# Patient Record
Sex: FEMALE | Race: WHITE | NOT HISPANIC OR LATINO | Employment: OTHER | URBAN - METROPOLITAN AREA
[De-identification: names, ages, dates, MRNs, and addresses within clinical notes are randomized per-mention and may not be internally consistent; named-entity substitution may affect disease eponyms.]

---

## 2021-08-01 ENCOUNTER — HOSPITAL ENCOUNTER (EMERGENCY)
Facility: HOSPITAL | Age: 46
Discharge: HOME | End: 2021-08-01
Attending: EMERGENCY MEDICINE | Admitting: EMERGENCY MEDICINE

## 2021-08-01 VITALS
DIASTOLIC BLOOD PRESSURE: 97 MMHG | RESPIRATION RATE: 18 BRPM | HEART RATE: 91 BPM | SYSTOLIC BLOOD PRESSURE: 161 MMHG | OXYGEN SATURATION: 97 % | HEIGHT: 70 IN | TEMPERATURE: 98 F | WEIGHT: 251.99 LBS | BODY MASS INDEX: 36.07 KG/M2

## 2021-08-01 DIAGNOSIS — R52 ENCOUNTER FOR PAIN MANAGEMENT: ICD-10-CM

## 2021-08-01 DIAGNOSIS — Z76.0 MEDICATION REFILL: Primary | ICD-10-CM

## 2021-08-01 PROCEDURE — 94799 UNLISTED PULMONARY SVC/PX: CPT

## 2021-08-01 PROCEDURE — 99283 EMERGENCY DEPT VISIT LOW MDM: CPT

## 2021-08-01 PROCEDURE — 94640 AIRWAY INHALATION TREATMENT: CPT

## 2021-08-01 PROCEDURE — 81001 URINALYSIS AUTO W/SCOPE: CPT | Performed by: NURSE PRACTITIONER

## 2021-08-01 RX ORDER — HYDROCHLOROTHIAZIDE 25 MG/1
25 TABLET ORAL DAILY
COMMUNITY
End: 2021-09-15

## 2021-08-01 RX ORDER — ALBUTEROL SULFATE 1.25 MG/3ML
1 SOLUTION RESPIRATORY (INHALATION) EVERY 6 HOURS PRN
Qty: 60 EACH | Refills: 0 | Status: SHIPPED | OUTPATIENT
Start: 2021-08-01 | End: 2021-09-15 | Stop reason: SDUPTHER

## 2021-08-01 RX ORDER — METHOCARBAMOL 500 MG/1
500 TABLET, FILM COATED ORAL 2 TIMES DAILY
COMMUNITY
End: 2021-11-30 | Stop reason: SDUPTHER

## 2021-08-01 RX ORDER — ONDANSETRON 4 MG/1
4 TABLET, FILM COATED ORAL EVERY 8 HOURS PRN
COMMUNITY
End: 2021-09-15 | Stop reason: SDUPTHER

## 2021-08-01 RX ORDER — MONTELUKAST SODIUM 10 MG/1
10 TABLET ORAL NIGHTLY
COMMUNITY
End: 2021-09-15 | Stop reason: SDUPTHER

## 2021-08-01 RX ORDER — IBUPROFEN 800 MG/1
800 TABLET ORAL EVERY 6 HOURS PRN
Qty: 60 TABLET | Refills: 0 | Status: SHIPPED | OUTPATIENT
Start: 2021-08-01 | End: 2021-09-15

## 2021-08-01 RX ORDER — PANTOPRAZOLE SODIUM 40 MG/1
40 TABLET, DELAYED RELEASE ORAL DAILY
COMMUNITY
End: 2021-11-30 | Stop reason: SDUPTHER

## 2021-08-01 RX ORDER — CETIRIZINE HYDROCHLORIDE 10 MG/1
10 TABLET ORAL DAILY
COMMUNITY
End: 2021-11-30 | Stop reason: SDUPTHER

## 2021-08-01 RX ORDER — PRAZOSIN HYDROCHLORIDE 2 MG/1
2 CAPSULE ORAL NIGHTLY
COMMUNITY
End: 2022-06-01 | Stop reason: SDUPTHER

## 2021-08-01 RX ORDER — QUETIAPINE FUMARATE 50 MG/1
50 TABLET, FILM COATED ORAL NIGHTLY
COMMUNITY
End: 2021-09-15

## 2021-08-01 RX ORDER — ALPRAZOLAM 1 MG/1
1 TABLET ORAL 3 TIMES DAILY
COMMUNITY

## 2021-08-01 RX ORDER — BUSPIRONE HYDROCHLORIDE 15 MG/1
15 TABLET ORAL 2 TIMES DAILY
COMMUNITY
End: 2022-04-12

## 2021-08-01 RX ORDER — IPRATROPIUM BROMIDE AND ALBUTEROL SULFATE 2.5; .5 MG/3ML; MG/3ML
3 SOLUTION RESPIRATORY (INHALATION) ONCE
Status: COMPLETED | OUTPATIENT
Start: 2021-08-01 | End: 2021-08-01

## 2021-08-01 RX ORDER — CITALOPRAM 40 MG/1
40 TABLET ORAL DAILY
COMMUNITY
End: 2021-10-11

## 2021-08-01 RX ORDER — SOAP
BAR TOPICAL
COMMUNITY
End: 2021-09-15

## 2021-08-01 RX ORDER — OXYCODONE HYDROCHLORIDE 30 MG/1
30 TABLET, FILM COATED, EXTENDED RELEASE ORAL 3 TIMES DAILY
COMMUNITY
End: 2021-09-15

## 2021-08-01 RX ORDER — ALBUTEROL SULFATE 90 UG/1
2 AEROSOL, METERED RESPIRATORY (INHALATION) EVERY 4 HOURS PRN
Qty: 6.7 G | Refills: 0 | Status: SHIPPED | OUTPATIENT
Start: 2021-08-01 | End: 2021-09-15 | Stop reason: SDUPTHER

## 2021-08-01 RX ORDER — PREGABALIN 100 MG/1
150 CAPSULE ORAL 2 TIMES DAILY
COMMUNITY
End: 2021-09-15

## 2021-08-01 RX ORDER — PREGABALIN 150 MG/1
150 CAPSULE ORAL 2 TIMES DAILY
Qty: 60 CAPSULE | Refills: 0 | Status: SHIPPED | OUTPATIENT
Start: 2021-08-01 | End: 2021-09-15

## 2021-08-01 RX ADMIN — IPRATROPIUM BROMIDE AND ALBUTEROL SULFATE 3 ML: .5; 2.5 SOLUTION RESPIRATORY (INHALATION) at 22:28

## 2021-08-02 LAB
BACTERIA UR QL AUTO: ABNORMAL /HPF
BILIRUB UR QL STRIP: NEGATIVE
CLARITY UR: ABNORMAL
COLOR UR: YELLOW
GLUCOSE UR STRIP-MCNC: NEGATIVE MG/DL
HGB UR QL STRIP.AUTO: NEGATIVE
HYALINE CASTS UR QL AUTO: ABNORMAL /LPF
KETONES UR QL STRIP: ABNORMAL
LEUKOCYTE ESTERASE UR QL STRIP.AUTO: ABNORMAL
NITRITE UR QL STRIP: NEGATIVE
PH UR STRIP.AUTO: 5.5 [PH] (ref 5–8)
PROT UR QL STRIP: NEGATIVE
RBC # UR: ABNORMAL /HPF
REF LAB TEST METHOD: ABNORMAL
SP GR UR STRIP: >1.03 (ref 1–1.03)
SQUAMOUS #/AREA URNS HPF: ABNORMAL /HPF
UROBILINOGEN UR QL STRIP: ABNORMAL
WBC UR QL AUTO: ABNORMAL /HPF
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
## 2021-08-02 NOTE — DISCHARGE INSTRUCTIONS
Take your meds as prescribed.  Call your pain management doctor in [place] for recommendations on what to do concerning your pain medications until you return there.  Return to the emergency department for any acutely developing abdominal pain, any persistent vomiting, any neurological symptoms or any new or worse concerns.

## 2021-09-15 ENCOUNTER — OFFICE VISIT (OUTPATIENT)
Dept: FAMILY MEDICINE CLINIC | Facility: CLINIC | Age: 46
End: 2021-09-15

## 2021-09-15 VITALS
BODY MASS INDEX: 40.43 KG/M2 | DIASTOLIC BLOOD PRESSURE: 68 MMHG | OXYGEN SATURATION: 97 % | HEART RATE: 86 BPM | RESPIRATION RATE: 18 BRPM | HEIGHT: 70 IN | WEIGHT: 282.4 LBS | SYSTOLIC BLOOD PRESSURE: 119 MMHG

## 2021-09-15 DIAGNOSIS — G43.801 OTHER MIGRAINE WITH STATUS MIGRAINOSUS, NOT INTRACTABLE: Primary | ICD-10-CM

## 2021-09-15 DIAGNOSIS — R52 ENCOUNTER FOR PAIN MANAGEMENT: ICD-10-CM

## 2021-09-15 DIAGNOSIS — R30.0 BURNING WITH URINATION: ICD-10-CM

## 2021-09-15 DIAGNOSIS — J45.909 ASTHMA, UNSPECIFIED ASTHMA SEVERITY, UNSPECIFIED WHETHER COMPLICATED, UNSPECIFIED WHETHER PERSISTENT: ICD-10-CM

## 2021-09-15 DIAGNOSIS — Z12.11 COLON CANCER SCREENING: ICD-10-CM

## 2021-09-15 DIAGNOSIS — D25.9 UTERINE LEIOMYOMA, UNSPECIFIED LOCATION: ICD-10-CM

## 2021-09-15 DIAGNOSIS — R26.89 DECREASED MOBILITY: ICD-10-CM

## 2021-09-15 DIAGNOSIS — J30.9 ALLERGIC RHINITIS, UNSPECIFIED SEASONALITY, UNSPECIFIED TRIGGER: ICD-10-CM

## 2021-09-15 DIAGNOSIS — Z76.0 MEDICATION REFILL: ICD-10-CM

## 2021-09-15 DIAGNOSIS — Z12.31 BREAST CANCER SCREENING BY MAMMOGRAM: ICD-10-CM

## 2021-09-15 DIAGNOSIS — R11.0 NAUSEA: ICD-10-CM

## 2021-09-15 DIAGNOSIS — Z79.899 LONG TERM USE OF DRUG: ICD-10-CM

## 2021-09-15 DIAGNOSIS — R53.1 GENERALIZED WEAKNESS: ICD-10-CM

## 2021-09-15 LAB
AMPHET+METHAMPHET UR QL: NEGATIVE
AMPHETAMINE INTERNAL CONTROL: ABNORMAL
AMPHETAMINES UR QL: NEGATIVE
BARBITURATE INTERNAL CONTROL: ABNORMAL
BARBITURATES UR QL SCN: NEGATIVE
BENZODIAZ UR QL SCN: POSITIVE
BENZODIAZEPINE INTERNAL CONTROL: ABNORMAL
BILIRUB BLD-MCNC: NEGATIVE MG/DL
BUPRENORPHINE INTERNAL CONTROL: ABNORMAL
BUPRENORPHINE SERPL-MCNC: NEGATIVE NG/ML
CANNABINOIDS SERPL QL: NEGATIVE
CLARITY, POC: ABNORMAL
COCAINE INTERNAL CONTROL: ABNORMAL
COCAINE UR QL: NEGATIVE
COLOR UR: YELLOW
EXPIRATION DATE: ABNORMAL
GLUCOSE UR STRIP-MCNC: NEGATIVE MG/DL
KETONES UR QL: NEGATIVE
LEUKOCYTE EST, POC: NEGATIVE
Lab: ABNORMAL
MDMA (ECSTASY) INTERNAL CONTROL: ABNORMAL
MDMA UR QL SCN: NEGATIVE
METHADONE INTERNAL CONTROL: ABNORMAL
METHADONE UR QL SCN: NEGATIVE
METHAMPHETAMINE INTERNAL CONTROL: ABNORMAL
NITRITE UR-MCNC: NEGATIVE MG/ML
OPIATES INTERNAL CONTROL: ABNORMAL
OPIATES UR QL: NEGATIVE
OXYCODONE INTERNAL CONTROL: ABNORMAL
OXYCODONE UR QL SCN: NEGATIVE
PCP UR QL SCN: NEGATIVE
PH UR: 6 [PH] (ref 5–8)
PHENCYCLIDINE INTERNAL CONTROL: ABNORMAL
PROT UR STRIP-MCNC: ABNORMAL MG/DL
RBC # UR STRIP: NEGATIVE /UL
SP GR UR: 1.03 (ref 1–1.03)
THC INTERNAL CONTROL: ABNORMAL
UROBILINOGEN UR QL: NORMAL

## 2021-09-15 PROCEDURE — 81003 URINALYSIS AUTO W/O SCOPE: CPT | Performed by: NURSE PRACTITIONER

## 2021-09-15 PROCEDURE — 80305 DRUG TEST PRSMV DIR OPT OBS: CPT | Performed by: NURSE PRACTITIONER

## 2021-09-15 PROCEDURE — 99204 OFFICE O/P NEW MOD 45 MIN: CPT | Performed by: NURSE PRACTITIONER

## 2021-09-15 RX ORDER — OXYCODONE HYDROCHLORIDE 30 MG/1
30 TABLET ORAL EVERY 8 HOURS PRN
COMMUNITY
End: 2021-09-15

## 2021-09-15 RX ORDER — ALBUTEROL SULFATE 1.25 MG/3ML
1 SOLUTION RESPIRATORY (INHALATION) EVERY 6 HOURS PRN
Qty: 60 EACH | Refills: 0 | Status: SHIPPED | OUTPATIENT
Start: 2021-09-15 | End: 2021-10-11 | Stop reason: SDUPTHER

## 2021-09-15 RX ORDER — SUMATRIPTAN 100 MG/1
100 TABLET, FILM COATED ORAL
COMMUNITY
End: 2021-09-15 | Stop reason: SDUPTHER

## 2021-09-15 RX ORDER — QUETIAPINE 300 MG/1
300 TABLET, FILM COATED, EXTENDED RELEASE ORAL
COMMUNITY
Start: 2021-07-11 | End: 2022-04-12

## 2021-09-15 RX ORDER — MONTELUKAST SODIUM 10 MG/1
10 TABLET ORAL NIGHTLY
Qty: 90 TABLET | Refills: 1 | Status: SHIPPED | OUTPATIENT
Start: 2021-09-15 | End: 2021-10-11 | Stop reason: SDUPTHER

## 2021-09-15 RX ORDER — SUMATRIPTAN 100 MG/1
100 TABLET, FILM COATED ORAL
Qty: 30 TABLET | Refills: 1 | Status: SHIPPED | OUTPATIENT
Start: 2021-09-15 | End: 2021-11-30 | Stop reason: SDUPTHER

## 2021-09-15 RX ORDER — ALBUTEROL SULFATE 90 UG/1
2 AEROSOL, METERED RESPIRATORY (INHALATION) EVERY 4 HOURS PRN
Qty: 6.7 G | Refills: 3 | Status: SHIPPED | OUTPATIENT
Start: 2021-09-15 | End: 2021-10-11 | Stop reason: SDUPTHER

## 2021-09-15 RX ORDER — ARIPIPRAZOLE 15 MG/1
15 TABLET ORAL DAILY
COMMUNITY
Start: 2021-09-04 | End: 2021-09-15

## 2021-09-15 RX ORDER — PREGABALIN 150 MG/1
150 CAPSULE ORAL 2 TIMES DAILY
Qty: 60 CAPSULE | Refills: 2 | Status: SHIPPED | OUTPATIENT
Start: 2021-09-15 | End: 2021-11-30 | Stop reason: SDUPTHER

## 2021-09-15 RX ORDER — ONDANSETRON 4 MG/1
4 TABLET, FILM COATED ORAL EVERY 8 HOURS PRN
Qty: 30 TABLET | Refills: 1 | Status: SHIPPED | OUTPATIENT
Start: 2021-09-15 | End: 2021-11-30 | Stop reason: SDUPTHER

## 2021-09-15 NOTE — ASSESSMENT & PLAN NOTE
Headaches are well controlled per patient report with Imitrex as needed.  Continue current medication
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
## 2021-09-15 NOTE — ASSESSMENT & PLAN NOTE
We will make referral to physical therapy today for evaluation and treatment.  Patient states that she wishes to regain her mobility to the point of no longer being disabled.

## 2021-09-22 ENCOUNTER — TELEPHONE (OUTPATIENT)
Dept: FAMILY MEDICINE CLINIC | Facility: CLINIC | Age: 46
End: 2021-09-22

## 2021-10-07 ENCOUNTER — TELEPHONE (OUTPATIENT)
Dept: FAMILY MEDICINE CLINIC | Facility: CLINIC | Age: 46
End: 2021-10-07

## 2021-10-07 NOTE — TELEPHONE ENCOUNTER
Pt came by the office with chest discomfort.  Said she went to ED but could not stay due to anxiety. Advised her to go back.  She has appt on Monday

## 2021-10-11 ENCOUNTER — OFFICE VISIT (OUTPATIENT)
Dept: FAMILY MEDICINE CLINIC | Facility: CLINIC | Age: 46
End: 2021-10-11

## 2021-10-11 VITALS
WEIGHT: 286.6 LBS | DIASTOLIC BLOOD PRESSURE: 89 MMHG | BODY MASS INDEX: 41.03 KG/M2 | SYSTOLIC BLOOD PRESSURE: 130 MMHG | OXYGEN SATURATION: 98 % | HEIGHT: 70 IN | HEART RATE: 96 BPM

## 2021-10-11 DIAGNOSIS — M25.559 HIP PAIN: ICD-10-CM

## 2021-10-11 DIAGNOSIS — R07.89 CHEST DISCOMFORT: Primary | ICD-10-CM

## 2021-10-11 DIAGNOSIS — R06.02 SOB (SHORTNESS OF BREATH): ICD-10-CM

## 2021-10-11 DIAGNOSIS — J30.9 ALLERGIC RHINITIS, UNSPECIFIED SEASONALITY, UNSPECIFIED TRIGGER: ICD-10-CM

## 2021-10-11 DIAGNOSIS — M79.604 PAIN OF RIGHT LOWER EXTREMITY: ICD-10-CM

## 2021-10-11 DIAGNOSIS — M54.50 LOW BACK PAIN, UNSPECIFIED BACK PAIN LATERALITY, UNSPECIFIED CHRONICITY, UNSPECIFIED WHETHER SCIATICA PRESENT: ICD-10-CM

## 2021-10-11 DIAGNOSIS — J45.909 ASTHMA, UNSPECIFIED ASTHMA SEVERITY, UNSPECIFIED WHETHER COMPLICATED, UNSPECIFIED WHETHER PERSISTENT: ICD-10-CM

## 2021-10-11 DIAGNOSIS — R60.9 SWELLING: ICD-10-CM

## 2021-10-11 PROCEDURE — 93000 ELECTROCARDIOGRAM COMPLETE: CPT | Performed by: NURSE PRACTITIONER

## 2021-10-11 PROCEDURE — 99214 OFFICE O/P EST MOD 30 MIN: CPT | Performed by: NURSE PRACTITIONER

## 2021-10-11 RX ORDER — ATOMOXETINE 40 MG/1
40 CAPSULE ORAL EVERY MORNING
COMMUNITY
Start: 2021-10-08 | End: 2023-04-04

## 2021-10-11 RX ORDER — FUROSEMIDE 20 MG/1
20 TABLET ORAL DAILY
Qty: 30 TABLET | Refills: 0 | Status: SHIPPED | OUTPATIENT
Start: 2021-10-11 | End: 2021-11-10 | Stop reason: SDUPTHER

## 2021-10-11 RX ORDER — HYDROCHLOROTHIAZIDE 25 MG/1
25 TABLET ORAL DAILY
COMMUNITY
End: 2021-10-11 | Stop reason: SDUPTHER

## 2021-10-11 RX ORDER — ALBUTEROL SULFATE 90 UG/1
2 AEROSOL, METERED RESPIRATORY (INHALATION) EVERY 4 HOURS PRN
Qty: 20.1 G | Refills: 1 | OUTPATIENT
Start: 2021-10-11 | End: 2021-11-20

## 2021-10-11 RX ORDER — MONTELUKAST SODIUM 10 MG/1
10 TABLET ORAL NIGHTLY
Qty: 90 TABLET | Refills: 1 | Status: SHIPPED | OUTPATIENT
Start: 2021-10-11 | End: 2021-11-30 | Stop reason: SDUPTHER

## 2021-10-11 RX ORDER — HYDROCHLOROTHIAZIDE 25 MG/1
25 TABLET ORAL DAILY
Qty: 90 TABLET | Refills: 1 | Status: SHIPPED | OUTPATIENT
Start: 2021-10-11 | End: 2021-11-30 | Stop reason: SDUPTHER

## 2021-10-11 RX ORDER — ALBUTEROL SULFATE 1.25 MG/3ML
1 SOLUTION RESPIRATORY (INHALATION) EVERY 6 HOURS PRN
Qty: 60 EACH | Refills: 2 | OUTPATIENT
Start: 2021-10-11 | End: 2021-11-20

## 2021-10-11 NOTE — ASSESSMENT & PLAN NOTE
Discussed that we can go ahead and start her on a low-dose Lasix today to help with the swelling.  Discussed that Lasix can potentially pull her potassium levels down so we will check labs today and if potassium has to be increased or if potassium is low side of normal we may add a potassium supplement.  Patient verbalized understanding
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
## 2021-10-11 NOTE — ASSESSMENT & PLAN NOTE
If patient has any worsening shortness of breath she is to seek immediate reevaluation.  Evaluation of labs and chest x-ray ordered.  EKG was normal.

## 2021-10-11 NOTE — ASSESSMENT & PLAN NOTE
If patient's labs and chest x-ray come back normal, we will consider referral to cardio for further evaluation of chest discomfort.

## 2021-10-12 ENCOUNTER — TELEPHONE (OUTPATIENT)
Dept: FAMILY MEDICINE CLINIC | Facility: CLINIC | Age: 46
End: 2021-10-12

## 2021-10-12 ENCOUNTER — HOSPITAL ENCOUNTER (OUTPATIENT)
Dept: GENERAL RADIOLOGY | Facility: HOSPITAL | Age: 46
Discharge: HOME | End: 2021-10-12

## 2021-10-12 ENCOUNTER — LAB (OUTPATIENT)
Dept: LAB | Facility: HOSPITAL | Age: 46
End: 2021-10-12

## 2021-10-12 DIAGNOSIS — M25.559 HIP PAIN: ICD-10-CM

## 2021-10-12 DIAGNOSIS — M79.604 PAIN OF RIGHT LOWER EXTREMITY: ICD-10-CM

## 2021-10-12 DIAGNOSIS — R07.89 CHEST DISCOMFORT: ICD-10-CM

## 2021-10-12 DIAGNOSIS — M54.50 LOW BACK PAIN, UNSPECIFIED BACK PAIN LATERALITY, UNSPECIFIED CHRONICITY, UNSPECIFIED WHETHER SCIATICA PRESENT: ICD-10-CM

## 2021-10-12 DIAGNOSIS — R60.9 SWELLING: ICD-10-CM

## 2021-10-12 DIAGNOSIS — M25.559 HIP PAIN: Primary | ICD-10-CM

## 2021-10-12 LAB
ALBUMIN SERPL-MCNC: 4.3 G/DL (ref 3.5–5.2)
ALBUMIN/GLOB SERPL: 1.4 G/DL
ALP SERPL-CCNC: 77 U/L (ref 39–117)
ALT SERPL W P-5'-P-CCNC: 15 U/L (ref 1–33)
ANION GAP SERPL CALCULATED.3IONS-SCNC: 10.1 MMOL/L (ref 5–15)
AST SERPL-CCNC: 16 U/L (ref 1–32)
BASOPHILS # BLD AUTO: 0.08 10*3/MM3 (ref 0–0.2)
BASOPHILS NFR BLD AUTO: 1.1 % (ref 0–1.5)
BILIRUB SERPL-MCNC: 0.3 MG/DL (ref 0–1.2)
BUN SERPL-MCNC: 17 MG/DL (ref 6–20)
BUN/CREAT SERPL: 22.7 (ref 7–25)
CALCIUM SPEC-SCNC: 8.9 MG/DL (ref 8.6–10.5)
CHLORIDE SERPL-SCNC: 102 MMOL/L (ref 98–107)
CO2 SERPL-SCNC: 25.9 MMOL/L (ref 22–29)
CREAT SERPL-MCNC: 0.75 MG/DL (ref 0.57–1)
DEPRECATED RDW RBC AUTO: 40.5 FL (ref 37–54)
EOSINOPHIL # BLD AUTO: 0.42 10*3/MM3 (ref 0–0.4)
EOSINOPHIL NFR BLD AUTO: 5.6 % (ref 0.3–6.2)
ERYTHROCYTE [DISTWIDTH] IN BLOOD BY AUTOMATED COUNT: 12.5 % (ref 12.3–15.4)
GFR SERPL CREATININE-BSD FRML MDRD: 84 ML/MIN/1.73
GLOBULIN UR ELPH-MCNC: 3 GM/DL
GLUCOSE SERPL-MCNC: 90 MG/DL (ref 65–99)
HCT VFR BLD AUTO: 41.8 % (ref 34–46.6)
HGB BLD-MCNC: 13.4 G/DL (ref 12–15.9)
IMM GRANULOCYTES # BLD AUTO: 0.02 10*3/MM3 (ref 0–0.05)
IMM GRANULOCYTES NFR BLD AUTO: 0.3 % (ref 0–0.5)
LYMPHOCYTES # BLD AUTO: 2.26 10*3/MM3 (ref 0.7–3.1)
LYMPHOCYTES NFR BLD AUTO: 29.9 % (ref 19.6–45.3)
MCH RBC QN AUTO: 28.7 PG (ref 26.6–33)
MCHC RBC AUTO-ENTMCNC: 32.1 G/DL (ref 31.5–35.7)
MCV RBC AUTO: 89.5 FL (ref 79–97)
MONOCYTES # BLD AUTO: 0.66 10*3/MM3 (ref 0.1–0.9)
MONOCYTES NFR BLD AUTO: 8.7 % (ref 5–12)
NEUTROPHILS NFR BLD AUTO: 4.12 10*3/MM3 (ref 1.7–7)
NEUTROPHILS NFR BLD AUTO: 54.4 % (ref 42.7–76)
NRBC BLD AUTO-RTO: 0 /100 WBC (ref 0–0.2)
NT-PROBNP SERPL-MCNC: <5 PG/ML (ref 0–450)
PLATELET # BLD AUTO: 262 10*3/MM3 (ref 140–450)
PMV BLD AUTO: 11.5 FL (ref 6–12)
POTASSIUM SERPL-SCNC: 4 MMOL/L (ref 3.5–5.2)
PROT SERPL-MCNC: 7.3 G/DL (ref 6–8.5)
RBC # BLD AUTO: 4.67 10*6/MM3 (ref 3.77–5.28)
SODIUM SERPL-SCNC: 138 MMOL/L (ref 136–145)
T4 FREE SERPL-MCNC: 1.32 NG/DL (ref 0.93–1.7)
TSH SERPL DL<=0.05 MIU/L-ACNC: 2.59 UIU/ML (ref 0.27–4.2)
WBC # BLD AUTO: 7.56 10*3/MM3 (ref 3.4–10.8)

## 2021-10-12 PROCEDURE — 36415 COLL VENOUS BLD VENIPUNCTURE: CPT

## 2021-10-12 PROCEDURE — 80053 COMPREHEN METABOLIC PANEL: CPT

## 2021-10-12 PROCEDURE — 71046 X-RAY EXAM CHEST 2 VIEWS: CPT

## 2021-10-12 PROCEDURE — 85025 COMPLETE CBC W/AUTO DIFF WBC: CPT

## 2021-10-12 PROCEDURE — 72100 X-RAY EXAM L-S SPINE 2/3 VWS: CPT

## 2021-10-12 PROCEDURE — 73502 X-RAY EXAM HIP UNI 2-3 VIEWS: CPT

## 2021-10-12 PROCEDURE — 84443 ASSAY THYROID STIM HORMONE: CPT | Performed by: NURSE PRACTITIONER

## 2021-10-12 PROCEDURE — 83880 ASSAY OF NATRIURETIC PEPTIDE: CPT

## 2021-10-12 PROCEDURE — 84439 ASSAY OF FREE THYROXINE: CPT | Performed by: NURSE PRACTITIONER

## 2021-10-13 ENCOUNTER — TELEPHONE (OUTPATIENT)
Dept: FAMILY MEDICINE CLINIC | Facility: CLINIC | Age: 46
End: 2021-10-13

## 2021-10-13 DIAGNOSIS — R07.89 CHEST DISCOMFORT: Primary | ICD-10-CM

## 2021-11-10 DIAGNOSIS — R60.9 SWELLING: ICD-10-CM

## 2021-11-10 RX ORDER — FUROSEMIDE 20 MG/1
20 TABLET ORAL DAILY
Qty: 30 TABLET | Refills: 0 | Status: SHIPPED | OUTPATIENT
Start: 2021-11-10 | End: 2021-11-30 | Stop reason: SDUPTHER

## 2021-11-30 ENCOUNTER — OFFICE VISIT (OUTPATIENT)
Dept: FAMILY MEDICINE CLINIC | Facility: CLINIC | Age: 46
End: 2021-11-30

## 2021-11-30 VITALS
BODY MASS INDEX: 42.62 KG/M2 | SYSTOLIC BLOOD PRESSURE: 132 MMHG | DIASTOLIC BLOOD PRESSURE: 89 MMHG | HEART RATE: 88 BPM | WEIGHT: 293 LBS | OXYGEN SATURATION: 96 %

## 2021-11-30 DIAGNOSIS — J45.909 ASTHMA, UNSPECIFIED ASTHMA SEVERITY, UNSPECIFIED WHETHER COMPLICATED, UNSPECIFIED WHETHER PERSISTENT: ICD-10-CM

## 2021-11-30 DIAGNOSIS — G89.29 CHRONIC LEFT SHOULDER PAIN: ICD-10-CM

## 2021-11-30 DIAGNOSIS — R52 ENCOUNTER FOR PAIN MANAGEMENT: ICD-10-CM

## 2021-11-30 DIAGNOSIS — G43.801 OTHER MIGRAINE WITH STATUS MIGRAINOSUS, NOT INTRACTABLE: ICD-10-CM

## 2021-11-30 DIAGNOSIS — Z12.11 SCREENING FOR COLON CANCER: ICD-10-CM

## 2021-11-30 DIAGNOSIS — Z98.890 HISTORY OF CERVICAL SPINAL SURGERY: ICD-10-CM

## 2021-11-30 DIAGNOSIS — J20.9 BRONCHITIS WITH BRONCHOSPASM: ICD-10-CM

## 2021-11-30 DIAGNOSIS — R60.9 SWELLING: ICD-10-CM

## 2021-11-30 DIAGNOSIS — K21.9 GASTROESOPHAGEAL REFLUX DISEASE, UNSPECIFIED WHETHER ESOPHAGITIS PRESENT: ICD-10-CM

## 2021-11-30 DIAGNOSIS — M25.512 CHRONIC LEFT SHOULDER PAIN: ICD-10-CM

## 2021-11-30 DIAGNOSIS — R11.0 NAUSEA: ICD-10-CM

## 2021-11-30 DIAGNOSIS — Z76.0 MEDICATION REFILL: ICD-10-CM

## 2021-11-30 DIAGNOSIS — E66.01 CLASS 3 SEVERE OBESITY WITH SERIOUS COMORBIDITY AND BODY MASS INDEX (BMI) OF 40.0 TO 44.9 IN ADULT, UNSPECIFIED OBESITY TYPE (HCC): ICD-10-CM

## 2021-11-30 DIAGNOSIS — J30.9 ALLERGIC RHINITIS, UNSPECIFIED SEASONALITY, UNSPECIFIED TRIGGER: ICD-10-CM

## 2021-11-30 DIAGNOSIS — Z12.31 ENCOUNTER FOR SCREENING MAMMOGRAM FOR MALIGNANT NEOPLASM OF BREAST: ICD-10-CM

## 2021-11-30 DIAGNOSIS — M54.2 NECK PAIN: Primary | ICD-10-CM

## 2021-11-30 PROCEDURE — 99214 OFFICE O/P EST MOD 30 MIN: CPT | Performed by: NURSE PRACTITIONER

## 2021-11-30 RX ORDER — IPRATROPIUM BROMIDE AND ALBUTEROL SULFATE 2.5; .5 MG/3ML; MG/3ML
3 SOLUTION RESPIRATORY (INHALATION) 3 TIMES DAILY PRN
Qty: 360 ML | Refills: 0 | Status: SHIPPED | OUTPATIENT
Start: 2021-11-30 | End: 2022-01-04 | Stop reason: SDUPTHER

## 2021-11-30 RX ORDER — IPRATROPIUM BROMIDE AND ALBUTEROL SULFATE 2.5; .5 MG/3ML; MG/3ML
3 SOLUTION RESPIRATORY (INHALATION)
Status: CANCELLED | OUTPATIENT
Start: 2021-11-30

## 2021-11-30 RX ORDER — PREGABALIN 150 MG/1
150 CAPSULE ORAL 2 TIMES DAILY
Qty: 60 CAPSULE | Refills: 2 | Status: SHIPPED | OUTPATIENT
Start: 2021-11-30 | End: 2022-05-31 | Stop reason: SDUPTHER

## 2021-11-30 RX ORDER — ALBUTEROL SULFATE 1.25 MG/3ML
1 SOLUTION RESPIRATORY (INHALATION) 3 TIMES DAILY PRN
Qty: 60 EACH | Refills: 2 | Status: SHIPPED | OUTPATIENT
Start: 2021-11-30 | End: 2022-01-04

## 2021-11-30 RX ORDER — HYDROCHLOROTHIAZIDE 25 MG/1
25 TABLET ORAL DAILY
Qty: 90 TABLET | Refills: 1 | Status: SHIPPED | OUTPATIENT
Start: 2021-11-30 | End: 2022-04-12

## 2021-11-30 RX ORDER — SUMATRIPTAN 100 MG/1
100 TABLET, FILM COATED ORAL
Qty: 30 TABLET | Refills: 1 | Status: SHIPPED | OUTPATIENT
Start: 2021-11-30 | End: 2022-01-04

## 2021-11-30 RX ORDER — METHOCARBAMOL 500 MG/1
500 TABLET, FILM COATED ORAL 2 TIMES DAILY PRN
Qty: 60 TABLET | Refills: 2 | Status: SHIPPED | OUTPATIENT
Start: 2021-11-30 | End: 2022-02-23

## 2021-11-30 RX ORDER — METHYLPREDNISOLONE 4 MG/1
TABLET ORAL
Qty: 1 EACH | Refills: 0 | Status: SHIPPED | OUTPATIENT
Start: 2021-11-30 | End: 2022-01-04

## 2021-11-30 RX ORDER — FUROSEMIDE 20 MG/1
20 TABLET ORAL DAILY
Qty: 30 TABLET | Refills: 0 | Status: SHIPPED | OUTPATIENT
Start: 2021-11-30 | End: 2021-12-20

## 2021-11-30 RX ORDER — PANTOPRAZOLE SODIUM 40 MG/1
40 TABLET, DELAYED RELEASE ORAL DAILY
Qty: 90 TABLET | Refills: 1 | Status: SHIPPED | OUTPATIENT
Start: 2021-11-30 | End: 2022-05-31 | Stop reason: SDUPTHER

## 2021-11-30 RX ORDER — MONTELUKAST SODIUM 10 MG/1
10 TABLET ORAL NIGHTLY
Qty: 90 TABLET | Refills: 1 | Status: SHIPPED | OUTPATIENT
Start: 2021-11-30 | End: 2022-05-31 | Stop reason: SDUPTHER

## 2021-11-30 RX ORDER — ONDANSETRON 4 MG/1
4 TABLET, FILM COATED ORAL EVERY 8 HOURS PRN
Qty: 30 TABLET | Refills: 1 | Status: SHIPPED | OUTPATIENT
Start: 2021-11-30 | End: 2022-05-31 | Stop reason: SDUPTHER

## 2021-11-30 RX ORDER — CETIRIZINE HYDROCHLORIDE 10 MG/1
10 TABLET ORAL DAILY
Qty: 90 TABLET | Refills: 1 | Status: SHIPPED | OUTPATIENT
Start: 2021-11-30 | End: 2022-05-31 | Stop reason: SDUPTHER

## 2021-12-14 ENCOUNTER — TELEPHONE (OUTPATIENT)
Dept: FAMILY MEDICINE CLINIC | Facility: CLINIC | Age: 46
End: 2021-12-14

## 2021-12-15 DIAGNOSIS — R60.9 SWELLING: ICD-10-CM

## 2021-12-20 DIAGNOSIS — J45.909 ASTHMA, UNSPECIFIED ASTHMA SEVERITY, UNSPECIFIED WHETHER COMPLICATED, UNSPECIFIED WHETHER PERSISTENT: ICD-10-CM

## 2021-12-20 RX ORDER — FUROSEMIDE 20 MG/1
20 TABLET ORAL DAILY
Qty: 90 TABLET | Refills: 0 | Status: SHIPPED | OUTPATIENT
Start: 2021-12-20 | End: 2022-04-12

## 2021-12-21 RX ORDER — ALBUTEROL SULFATE 90 UG/1
AEROSOL, METERED RESPIRATORY (INHALATION)
Qty: 42.5 G | Refills: 0 | Status: SHIPPED | OUTPATIENT
Start: 2021-12-21 | End: 2022-01-04

## 2022-01-04 ENCOUNTER — OFFICE VISIT (OUTPATIENT)
Dept: FAMILY MEDICINE CLINIC | Facility: CLINIC | Age: 47
End: 2022-01-04

## 2022-01-04 VITALS
DIASTOLIC BLOOD PRESSURE: 66 MMHG | WEIGHT: 293 LBS | HEIGHT: 70 IN | HEART RATE: 92 BPM | OXYGEN SATURATION: 98 % | SYSTOLIC BLOOD PRESSURE: 138 MMHG | BODY MASS INDEX: 41.95 KG/M2

## 2022-01-04 DIAGNOSIS — J45.909 ASTHMA, UNSPECIFIED ASTHMA SEVERITY, UNSPECIFIED WHETHER COMPLICATED, UNSPECIFIED WHETHER PERSISTENT: ICD-10-CM

## 2022-01-04 DIAGNOSIS — G43.809 OTHER MIGRAINE WITHOUT STATUS MIGRAINOSUS, NOT INTRACTABLE: ICD-10-CM

## 2022-01-04 DIAGNOSIS — M79.7 FIBROMYALGIA: Primary | ICD-10-CM

## 2022-01-04 PROCEDURE — 99214 OFFICE O/P EST MOD 30 MIN: CPT | Performed by: NURSE PRACTITIONER

## 2022-01-04 RX ORDER — ALBUTEROL SULFATE 90 UG/1
2 AEROSOL, METERED RESPIRATORY (INHALATION) EVERY 4 HOURS PRN
Qty: 18 G | Refills: 1 | Status: SHIPPED | OUTPATIENT
Start: 2022-01-04 | End: 2022-04-12

## 2022-01-04 RX ORDER — IPRATROPIUM BROMIDE AND ALBUTEROL SULFATE 2.5; .5 MG/3ML; MG/3ML
3 SOLUTION RESPIRATORY (INHALATION) 3 TIMES DAILY PRN
Qty: 360 ML | Refills: 1 | Status: SHIPPED | OUTPATIENT
Start: 2022-01-04 | End: 2022-04-12

## 2022-01-04 RX ORDER — DULOXETIN HYDROCHLORIDE 30 MG/1
30 CAPSULE, DELAYED RELEASE ORAL DAILY
Qty: 30 CAPSULE | Refills: 2 | Status: SHIPPED | OUTPATIENT
Start: 2022-01-04 | End: 2022-04-04

## 2022-01-04 RX ORDER — ZOLMITRIPTAN 2.5 MG/1
SPRAY, METERED NASAL
Qty: 30 EACH | Refills: 1 | Status: SHIPPED | OUTPATIENT
Start: 2022-01-04 | End: 2022-01-11 | Stop reason: CLARIF

## 2022-01-04 NOTE — PROGRESS NOTES
"Chief Complaint  Fibromyalgia, asthma, migraines  Subjective          Gabby Aleman presents to [facility]  History of Present Illness   Patient presents today with complaints of worsening fibromyalgia &  neck Pain (the patient states that she is having bad cervical pain, she states she had an appointment with PT yesterday but was unable to get out of bed she felt so bad.), Fibromyalgia (The patient states that her fibromyalgia is really bad at this point, states that she does not feel the Lyrica is doing enough., and Headache (The patient states she is having migraines 3-5 times a week with light sensitive, she would like to get botox for this. )  Pt c/o migraines since having epidural injection in neck in 2015. States prior to that she never had HA issues. States had cervical fusion in 2018 which made no improvement. States has light and sound sensitivity with nausea and vomiting.  States she was previously on the nasally inhaled triptan which worked better than the oral Imitrex, wants to switch back to this medication.  She is also interested in a referral to neurology if she is interested in Botox injections.    Patient states her asthma is doing better, she does need some refills.  She also wants to change her albuterol inhaler to specifically the Ventolin brand, states the other ones do not work for her.    Objective   Vital Signs:   /66 (BP Location: Right arm, Patient Position: Sitting, Cuff Size: Large Adult)   Pulse 92   Ht 177.8 cm (70\")   Wt (!) 145 kg (319 lb)   SpO2 98%   BMI 45.77 kg/m²     Physical Exam  Vitals reviewed.   Constitutional:       Appearance: Normal appearance. She is well-developed.   HENT:      Head: Normocephalic and atraumatic.      Right Ear: External ear normal.      Left Ear: External ear normal.   Eyes:      Conjunctiva/sclera: Conjunctivae normal.      Pupils: Pupils are equal, round, and reactive to light.   Cardiovascular:      " Rate and Rhythm: Normal rate and regular rhythm.      Heart sounds: No murmur heard.  No friction rub. No gallop.    Pulmonary:      Effort: Pulmonary effort is normal.      Breath sounds: Normal breath sounds. No wheezing or rhonchi.   Skin:     General: Skin is warm and dry.   Neurological:      Mental Status: She is alert and oriented to person, place, and time.      Cranial Nerves: No cranial nerve deficit.   Psychiatric:         Mood and Affect: Mood and affect normal.         Behavior: Behavior normal.         Thought Content: Thought content normal.         Judgment: Judgment normal.        Result Review :     CMP    CMP 10/12/21   Glucose 90   BUN 17   Creatinine 0.75   eGFR Non African Am 84   Sodium 138   Potassium 4.0   Chloride 102   Calcium 8.9   Albumin 4.30   Total Bilirubin 0.3   Alkaline Phosphatase 77   AST (SGOT) 16   ALT (SGPT) 15           CBC    CBC 10/12/21   WBC 7.56   RBC 4.67   Hemoglobin 13.4   Hematocrit 41.8   MCV 89.5   MCH 28.7   MCHC 32.1   RDW 12.5   Platelets 262           TSH    TSH 10/12/21   TSH 2.590                         Assessment and Plan    Diagnoses and all orders for this visit:    1. Fibromyalgia (Primary)  Assessment & Plan:  Patient complaining of worsening fibromyalgia pain, after discussion patient states she has never trialed duloxetine for this pain before.  Discussed with patient that this would be my recommendation and she agrees to give this a try.  We will start at 30 mg daily and she may call if no improvement within 4 to 6 weeks.  She may continue her Lyrica as well.    Orders:  -     DULoxetine (CYMBALTA) 30 MG capsule; Take 1 capsule by mouth Daily.  Dispense: 30 capsule; Refill: 2    2. Asthma, unspecified asthma severity, unspecified whether complicated, unspecified whether persistent  -     ipratropium-albuterol (DUO-NEB) 0.5-2.5 mg/3 ml nebulizer; Take 3 mL by nebulization 3 (Three) Times a Day As Needed for Wheezing.  Dispense: 360 mL; Refill: 1  -      fluticasone-salmeterol (Advair Diskus) 250-50 MCG/DOSE DISKUS; Inhale 1 puff 2 (Two) Times a Day for 15 days.  Dispense: 60 each; Refill: 3  -     Ventolin  (90 Base) MCG/ACT inhaler; Inhale 2 puffs Every 4 (Four) Hours As Needed for Wheezing.  Dispense: 18 g; Refill: 1    3. Other migraine without status migrainosus, not intractable  Assessment & Plan:  Patient reports that migraines are worsening in frequency, she is requesting referral to neurology for further evaluation of possible Botox injections.  She also states that her migraines were better controlled with as needed use of nasally inhaled triptan instead of oral, so we will make this change today as well.        Orders:  -     Ambulatory Referral to Neurology  -     ZOLMitriptan 2.5 MG solution; Once at HA onset, may repate once in 2hr if needed  Dispense: 30 each; Refill: 1      Follow Up   Return in about 3 months (around 4/4/2022).  Patient was given instructions and counseling regarding her condition or for health maintenance advice. Please see specific information pulled into the AVS if appropriate.

## 2022-01-04 NOTE — ASSESSMENT & PLAN NOTE
Patient complaining of worsening fibromyalgia pain, after discussion patient states she has never trialed duloxetine for this pain before.  Discussed with patient that this would be my recommendation and she agrees to give this a try.  We will start at 30 mg daily and she may call if no improvement within 4 to 6 weeks.  She may continue her Lyrica as well.

## 2022-01-04 NOTE — ASSESSMENT & PLAN NOTE
Patient reports that migraines are worsening in frequency, she is requesting referral to neurology for further evaluation of possible Botox injections.  She also states that her migraines were better controlled with as needed use of nasally inhaled triptan instead of oral, so we will make this change today as well.

## 2022-01-11 DIAGNOSIS — G43.901 MIGRAINE WITH STATUS MIGRAINOSUS, NOT INTRACTABLE, UNSPECIFIED MIGRAINE TYPE: Primary | ICD-10-CM

## 2022-01-11 RX ORDER — SUMATRIPTAN 100 MG/1
100 TABLET, FILM COATED ORAL
COMMUNITY
End: 2022-01-11 | Stop reason: SDUPTHER

## 2022-01-11 RX ORDER — SUMATRIPTAN 100 MG/1
100 TABLET, FILM COATED ORAL ONCE AS NEEDED
Qty: 13 TABLET | Refills: 0 | Status: SHIPPED | OUTPATIENT
Start: 2022-01-11 | End: 2022-05-31 | Stop reason: SDUPTHER

## 2022-02-10 ENCOUNTER — APPOINTMENT (OUTPATIENT)
Dept: MAMMOGRAPHY | Facility: HOSPITAL | Age: 47
End: 2022-02-10

## 2022-02-17 ENCOUNTER — TELEPHONE (OUTPATIENT)
Dept: FAMILY MEDICINE CLINIC | Facility: CLINIC | Age: 47
End: 2022-02-17

## 2022-02-17 NOTE — TELEPHONE ENCOUNTER
Phoned patient left a message on home phone to call to reschedule her mammogram at [phone] option 3.

## 2022-02-23 DIAGNOSIS — M25.512 CHRONIC LEFT SHOULDER PAIN: ICD-10-CM

## 2022-02-23 DIAGNOSIS — G89.29 CHRONIC LEFT SHOULDER PAIN: ICD-10-CM

## 2022-02-23 RX ORDER — METHOCARBAMOL 500 MG/1
TABLET, FILM COATED ORAL
Qty: 60 TABLET | Refills: 2 | Status: SHIPPED | OUTPATIENT
Start: 2022-02-23 | End: 2022-05-31 | Stop reason: SDUPTHER

## 2022-03-25 DIAGNOSIS — R60.9 SWELLING: ICD-10-CM

## 2022-03-25 DIAGNOSIS — J30.9 ALLERGIC RHINITIS, UNSPECIFIED SEASONALITY, UNSPECIFIED TRIGGER: ICD-10-CM

## 2022-03-25 RX ORDER — MONTELUKAST SODIUM 10 MG/1
10 TABLET ORAL NIGHTLY
Qty: 90 TABLET | Refills: 1 | OUTPATIENT
Start: 2022-03-25

## 2022-03-25 RX ORDER — HYDROCHLOROTHIAZIDE 25 MG/1
25 TABLET ORAL DAILY
Qty: 90 TABLET | Refills: 1 | OUTPATIENT
Start: 2022-03-25

## 2022-04-03 DIAGNOSIS — M79.7 FIBROMYALGIA: ICD-10-CM

## 2022-04-04 RX ORDER — DULOXETIN HYDROCHLORIDE 30 MG/1
30 CAPSULE, DELAYED RELEASE ORAL DAILY
Qty: 30 CAPSULE | Refills: 1 | Status: SHIPPED | OUTPATIENT
Start: 2022-04-04 | End: 2022-04-12

## 2022-04-19 ENCOUNTER — TELEPHONE (OUTPATIENT)
Dept: FAMILY MEDICINE CLINIC | Facility: CLINIC | Age: 47
End: 2022-04-19

## 2022-04-19 DIAGNOSIS — J45.909 ASTHMA, UNSPECIFIED ASTHMA SEVERITY, UNSPECIFIED WHETHER COMPLICATED, UNSPECIFIED WHETHER PERSISTENT: ICD-10-CM

## 2022-04-19 DIAGNOSIS — M54.2 NECK PAIN: Primary | ICD-10-CM

## 2022-04-19 DIAGNOSIS — J30.2 SEASONAL ALLERGIES: Primary | ICD-10-CM

## 2022-04-19 DIAGNOSIS — M25.559 HIP PAIN: ICD-10-CM

## 2022-04-19 DIAGNOSIS — M25.519 SHOULDER PAIN, UNSPECIFIED CHRONICITY, UNSPECIFIED LATERALITY: ICD-10-CM

## 2022-04-19 NOTE — TELEPHONE ENCOUNTER
Caller: Gabby Aleman    Relationship: Self    Best call back number: [phone]    What is the medical concern/diagnosis: ALLERGIES    What specialty or service is being requested: [facility]    What is the provider, practice or medical service name: [facility]    What is the office location: [address] [place] [address]    What is the office phone number: [phone]    Any additional details: PATIENT WOULD LIKE A NEW REFERRAL SENT TO THE [facility] OFFICE.

## 2022-04-19 NOTE — TELEPHONE ENCOUNTER
Caller: Gabby Aleman    Relationship: Self    Best call back number: [phone]    Requested Prescriptions:   Requested Prescriptions      No prescriptions requested or ordered in this encounter    [facility]    Pharmacy where request should be sent: [id number] - [place], [place] - [address] [facility] AT [facility] [address] & [place] - [phone] [facility] [phone] FX     Does the patient have less than a 3 day supply:  [x] Yes  [] No    Hemanth Wright Rep   04/19/22 16:41 EDT

## 2022-04-20 RX ORDER — ALBUTEROL SULFATE 90 UG/1
2 AEROSOL, METERED RESPIRATORY (INHALATION) EVERY 4 HOURS PRN
Qty: 18 G | Refills: 0 | Status: SHIPPED | OUTPATIENT
Start: 2022-04-20 | End: 2022-06-01

## 2022-04-21 DIAGNOSIS — E66.01 MORBID (SEVERE) OBESITY DUE TO EXCESS CALORIES: Primary | ICD-10-CM

## 2022-05-26 DIAGNOSIS — M79.7 FIBROMYALGIA: ICD-10-CM

## 2022-05-26 RX ORDER — DULOXETIN HYDROCHLORIDE 30 MG/1
30 CAPSULE, DELAYED RELEASE ORAL DAILY
Qty: 30 CAPSULE | Refills: 1 | OUTPATIENT
Start: 2022-05-26

## 2022-05-26 RX ORDER — DULOXETIN HYDROCHLORIDE 30 MG/1
30 CAPSULE, DELAYED RELEASE ORAL DAILY
Qty: 90 CAPSULE | Refills: 1 | Status: SHIPPED | OUTPATIENT
Start: 2022-05-26 | End: 2023-02-06

## 2022-05-26 NOTE — TELEPHONE ENCOUNTER
Patient says she is still taking this. She has an appointment with you 6/1/22, but says she does not have enough to last her to her appointment.

## 2022-06-01 ENCOUNTER — OFFICE VISIT (OUTPATIENT)
Dept: FAMILY MEDICINE CLINIC | Facility: CLINIC | Age: 47
End: 2022-06-01

## 2022-06-01 VITALS
SYSTOLIC BLOOD PRESSURE: 134 MMHG | OXYGEN SATURATION: 95 % | HEART RATE: 84 BPM | HEIGHT: 70 IN | DIASTOLIC BLOOD PRESSURE: 82 MMHG | BODY MASS INDEX: 41.95 KG/M2 | WEIGHT: 293 LBS

## 2022-06-01 DIAGNOSIS — Z86.018 HISTORY OF UTERINE FIBROID: ICD-10-CM

## 2022-06-01 DIAGNOSIS — R11.0 NAUSEA: ICD-10-CM

## 2022-06-01 DIAGNOSIS — R52 ENCOUNTER FOR PAIN MANAGEMENT: ICD-10-CM

## 2022-06-01 DIAGNOSIS — M25.512 CHRONIC LEFT SHOULDER PAIN: ICD-10-CM

## 2022-06-01 DIAGNOSIS — M25.559 CHRONIC HIP PAIN, UNSPECIFIED LATERALITY: ICD-10-CM

## 2022-06-01 DIAGNOSIS — Z13.220 SCREENING FOR LIPID DISORDERS: ICD-10-CM

## 2022-06-01 DIAGNOSIS — N92.6 IRREGULAR MENSES: ICD-10-CM

## 2022-06-01 DIAGNOSIS — G89.29 CHRONIC HIP PAIN, UNSPECIFIED LATERALITY: ICD-10-CM

## 2022-06-01 DIAGNOSIS — Z11.59 NEED FOR HEPATITIS C SCREENING TEST: ICD-10-CM

## 2022-06-01 DIAGNOSIS — J45.41 MODERATE PERSISTENT ASTHMA WITH EXACERBATION: Primary | ICD-10-CM

## 2022-06-01 DIAGNOSIS — G43.901 MIGRAINE WITH STATUS MIGRAINOSUS, NOT INTRACTABLE, UNSPECIFIED MIGRAINE TYPE: ICD-10-CM

## 2022-06-01 DIAGNOSIS — Z76.0 MEDICATION REFILL: ICD-10-CM

## 2022-06-01 DIAGNOSIS — Z01.89 ROUTINE LAB DRAW: ICD-10-CM

## 2022-06-01 DIAGNOSIS — K21.9 GASTROESOPHAGEAL REFLUX DISEASE, UNSPECIFIED WHETHER ESOPHAGITIS PRESENT: ICD-10-CM

## 2022-06-01 DIAGNOSIS — G89.29 CHRONIC NECK PAIN: ICD-10-CM

## 2022-06-01 DIAGNOSIS — Z79.899 MEDICATION MANAGEMENT: ICD-10-CM

## 2022-06-01 DIAGNOSIS — M54.2 CHRONIC NECK PAIN: ICD-10-CM

## 2022-06-01 DIAGNOSIS — Z13.29 SCREENING FOR THYROID DISORDER: ICD-10-CM

## 2022-06-01 DIAGNOSIS — H10.13 ALLERGIC CONJUNCTIVITIS OF BOTH EYES: ICD-10-CM

## 2022-06-01 DIAGNOSIS — J30.9 ALLERGIC RHINITIS, UNSPECIFIED SEASONALITY, UNSPECIFIED TRIGGER: ICD-10-CM

## 2022-06-01 DIAGNOSIS — G89.29 CHRONIC LEFT SHOULDER PAIN: ICD-10-CM

## 2022-06-01 DIAGNOSIS — R10.2 PELVIC PAIN: ICD-10-CM

## 2022-06-01 LAB
AMPHET+METHAMPHET UR QL: NEGATIVE
AMPHETAMINE INTERNAL CONTROL: ABNORMAL
AMPHETAMINES UR QL: NEGATIVE
BARBITURATE INTERNAL CONTROL: ABNORMAL
BARBITURATES UR QL SCN: NEGATIVE
BENZODIAZ UR QL SCN: POSITIVE
BENZODIAZEPINE INTERNAL CONTROL: ABNORMAL
BUPRENORPHINE INTERNAL CONTROL: ABNORMAL
BUPRENORPHINE SERPL-MCNC: NEGATIVE NG/ML
CANNABINOIDS SERPL QL: NEGATIVE
COCAINE INTERNAL CONTROL: ABNORMAL
COCAINE UR QL: NEGATIVE
EXPIRATION DATE: ABNORMAL
Lab: ABNORMAL
MDMA (ECSTASY) INTERNAL CONTROL: ABNORMAL
MDMA UR QL SCN: NEGATIVE
METHADONE INTERNAL CONTROL: ABNORMAL
METHADONE UR QL SCN: NEGATIVE
METHAMPHETAMINE INTERNAL CONTROL: ABNORMAL
OPIATES INTERNAL CONTROL: ABNORMAL
OPIATES UR QL: NEGATIVE
OXYCODONE INTERNAL CONTROL: ABNORMAL
OXYCODONE UR QL SCN: NEGATIVE
PCP UR QL SCN: NEGATIVE
PHENCYCLIDINE INTERNAL CONTROL: ABNORMAL
THC INTERNAL CONTROL: ABNORMAL

## 2022-06-01 PROCEDURE — 80305 DRUG TEST PRSMV DIR OPT OBS: CPT | Performed by: NURSE PRACTITIONER

## 2022-06-01 PROCEDURE — 99214 OFFICE O/P EST MOD 30 MIN: CPT | Performed by: NURSE PRACTITIONER

## 2022-06-01 RX ORDER — SUMATRIPTAN 100 MG/1
100 TABLET, FILM COATED ORAL ONCE AS NEEDED
Qty: 13 TABLET | Refills: 0 | Status: SHIPPED | OUTPATIENT
Start: 2022-06-01

## 2022-06-01 RX ORDER — BUSPIRONE HYDROCHLORIDE 30 MG/1
30 TABLET ORAL SEE ADMIN INSTRUCTIONS
Qty: 315 TABLET | Refills: 1 | Status: SHIPPED | OUTPATIENT
Start: 2022-06-01

## 2022-06-01 RX ORDER — OLOPATADINE HYDROCHLORIDE 1 MG/ML
1 SOLUTION/ DROPS OPHTHALMIC 2 TIMES DAILY
Qty: 5 ML | Refills: 0 | Status: SHIPPED | OUTPATIENT
Start: 2022-06-01 | End: 2022-07-01

## 2022-06-01 RX ORDER — METHOCARBAMOL 500 MG/1
500 TABLET, FILM COATED ORAL 2 TIMES DAILY PRN
Qty: 60 TABLET | Refills: 2 | Status: SHIPPED | OUTPATIENT
Start: 2022-06-01 | End: 2022-12-06

## 2022-06-01 RX ORDER — PANTOPRAZOLE SODIUM 40 MG/1
40 TABLET, DELAYED RELEASE ORAL DAILY
Qty: 90 TABLET | Refills: 1 | Status: SHIPPED | OUTPATIENT
Start: 2022-06-01 | End: 2023-02-02

## 2022-06-01 RX ORDER — PRAZOSIN HYDROCHLORIDE 2 MG/1
2 CAPSULE ORAL NIGHTLY
Qty: 90 CAPSULE | Refills: 1 | Status: SHIPPED | OUTPATIENT
Start: 2022-06-01

## 2022-06-01 RX ORDER — PREGABALIN 150 MG/1
150 CAPSULE ORAL 2 TIMES DAILY
Qty: 60 CAPSULE | Refills: 2 | Status: SHIPPED | OUTPATIENT
Start: 2022-06-01

## 2022-06-01 RX ORDER — ONDANSETRON 4 MG/1
4 TABLET, FILM COATED ORAL EVERY 8 HOURS PRN
Qty: 30 TABLET | Refills: 1 | Status: SHIPPED | OUTPATIENT
Start: 2022-06-01 | End: 2022-09-19

## 2022-06-01 RX ORDER — ALBUTEROL SULFATE 2.5 MG/.5ML
2.5 SOLUTION RESPIRATORY (INHALATION) EVERY 4 HOURS PRN
Qty: 45 ML | Refills: 0 | Status: SHIPPED | OUTPATIENT
Start: 2022-06-01 | End: 2022-06-16

## 2022-06-01 RX ORDER — ALBUTEROL SULFATE 90 UG/1
2 AEROSOL, METERED RESPIRATORY (INHALATION) EVERY 4 HOURS PRN
Qty: 18 G | Refills: 0 | Status: CANCELLED | OUTPATIENT
Start: 2022-06-01

## 2022-06-01 RX ORDER — BUDESONIDE AND FORMOTEROL FUMARATE DIHYDRATE 80; 4.5 UG/1; UG/1
2 AEROSOL RESPIRATORY (INHALATION)
Qty: 3 EACH | Refills: 1 | Status: SHIPPED | OUTPATIENT
Start: 2022-06-01 | End: 2022-11-03 | Stop reason: SDUPTHER

## 2022-06-01 RX ORDER — QUETIAPINE FUMARATE 300 MG/1
300 TABLET, FILM COATED ORAL
Qty: 90 TABLET | Refills: 1 | Status: SHIPPED | OUTPATIENT
Start: 2022-06-01 | End: 2022-11-08

## 2022-06-01 RX ORDER — FLUTICASONE PROPIONATE 50 MCG
2 SPRAY, SUSPENSION (ML) NASAL DAILY
Qty: 11.1 ML | Refills: 0 | Status: SHIPPED | OUTPATIENT
Start: 2022-06-01 | End: 2022-07-01

## 2022-06-01 RX ORDER — CETIRIZINE HYDROCHLORIDE 10 MG/1
10 TABLET ORAL DAILY
Qty: 90 TABLET | Refills: 1 | Status: SHIPPED | OUTPATIENT
Start: 2022-06-01

## 2022-06-01 RX ORDER — MONTELUKAST SODIUM 10 MG/1
10 TABLET ORAL NIGHTLY
Qty: 90 TABLET | Refills: 1 | Status: SHIPPED | OUTPATIENT
Start: 2022-06-01 | End: 2023-02-02

## 2022-06-01 RX ORDER — METHYLPREDNISOLONE 4 MG/1
TABLET ORAL
Qty: 1 EACH | Refills: 0 | Status: SHIPPED | OUTPATIENT
Start: 2022-06-01 | End: 2022-11-22

## 2022-06-01 NOTE — PROGRESS NOTES
Chief Complaint  Asthma (Albuterol Inhaler (Patient says this is not covered by insurance and is needing something else.) This was originally prescribed by urgent care and patient says she is needing all of the medicines filled that urgent care had sent in. ), Fibromyalgia (Cymbalta), Shoulder Pain (Methocarbamol), Allergies (Singulair), Nausea (Zofran), Heartburn (Pantoprazole), Migraine (Imitrex), and Shortness of Breath (Patient is wanting prednisone for chest tightness and SOB)    Subjective          Gabby Aleman presents to [facility] for   History of Present Illness     Patient presents today with complaints of asthma (Albuterol Inhaler (Patient says this is not covered by insurance and is needing something else.)  Fibromyalgia (Cymbalta), Shoulder Pain (Methocarbamol), Allergies (Singulair), Nausea (Zofran), Heartburn (Pantoprazole), Migraine (Imitrex), and c/o Shortness of Breath (Patient is wanting prednisone for chest tightness and SOB, having asthma flare)    Pt states hx of uterine fibroids and had surgery, now having pelvic pain again and thinks that her fibroids are getting larger again. States also having some period irregularities   Pt requesting referral to pain management for her chronic neck and hip pain.   Medical History  Past Medical History:   Diagnosis Date   • Allergic    • Anxiety    • Arthritis    • Asthma    • Depression    • Fibromyalgia    • Headache      Surgical History  Past Surgical History:   Procedure Laterality Date   • BACK SURGERY     • FACIAL FRACTURE SURGERY     • MYOMECTOMY     • NECK SURGERY     • SINUS SURGERY       Social History  Social History     Socioeconomic History   • Marital status: Single   Tobacco Use   • Smoking status: Never Smoker   • Smokeless tobacco: Never Used   Vaping Use   • Vaping Use: Never used   Substance and Sexual Activity   • Alcohol use: Never   • Drug use: Never   • Sexual activity: Defer       Current  Outpatient Medications:   •  albuterol (PROVENTIL) 2.5 MG/0.5ML nebulizer solution, Take 2.5 mg by nebulization Every 4 (Four) Hours As Needed for Wheezing or Shortness of Air for up to 15 days., Disp: 45 mL, Rfl: 0  •  ALPRAZolam (XANAX) 1 MG tablet, Take 1 mg by mouth 3 (Three) Times a Day., Disp: , Rfl:   •  atomoxetine (STRATTERA) 40 MG capsule, Take 40 mg by mouth Every Morning., Disp: , Rfl:   •  busPIRone (BUSPAR) 30 MG tablet, Take 1 tablet by mouth See Admin Instructions. TAKE ONE TABLET BY MOUTH TWICE DAILY THEN TAKE 1 AND 1/2 TABLETS BY MOUTH AT NIGHT, Disp: 315 tablet, Rfl: 1  •  cetirizine (zyrTEC) 10 MG tablet, Take 1 tablet by mouth Daily., Disp: 90 tablet, Rfl: 1  •  DULoxetine (CYMBALTA) 30 MG capsule, Take 1 capsule by mouth Daily., Disp: 90 capsule, Rfl: 1  •  fluticasone (FLONASE) 50 MCG/ACT nasal spray, 2 sprays into the nostril(s) as directed by provider Daily for 30 days., Disp: 11.1 mL, Rfl: 0  •  methocarbamol (ROBAXIN) 500 MG tablet, Take 1 tablet by mouth 2 (Two) Times a Day As Needed for Muscle Spasms., Disp: 60 tablet, Rfl: 2  •  montelukast (SINGULAIR) 10 MG tablet, Take 1 tablet by mouth Every Night., Disp: 90 tablet, Rfl: 1  •  olopatadine (Patanol) 0.1 % ophthalmic solution, Administer 1 drop to both eyes 2 (Two) Times a Day for 30 days., Disp: 5 mL, Rfl: 0  •  ondansetron (ZOFRAN) 4 MG tablet, Take 1 tablet by mouth Every 8 (Eight) Hours As Needed for Nausea or Vomiting., Disp: 30 tablet, Rfl: 1  •  pantoprazole (PROTONIX) 40 MG EC tablet, Take 1 tablet by mouth Daily., Disp: 90 tablet, Rfl: 1  •  prazosin (MINIPRESS) 2 MG capsule, Take 1 capsule by mouth Every Night., Disp: 90 capsule, Rfl: 1  •  pregabalin (Lyrica) 150 MG capsule, Take 1 capsule by mouth 2 (Two) Times a Day., Disp: 60 capsule, Rfl: 2  •  QUEtiapine (SEROquel) 300 MG tablet, Take 1 tablet by mouth every night at bedtime., Disp: 90 tablet, Rfl: 1  •  SUMAtriptan (IMITREX) 100 MG tablet, Take 1 tablet by mouth 1  "(One) Time As Needed (migraine) for up to 1 dose. Take one tab at onset HA. May repeat dose once in 2 hours if HA not relieved., Disp: 13 tablet, Rfl: 0  •  budesonide-formoterol (Symbicort) 80-4.5 MCG/ACT inhaler, Inhale 2 puffs 2 (Two) Times a Day., Disp: 3 each, Rfl: 1  •  methylPREDNISolone (MEDROL) 4 MG dose pack, Take as directed on package instructions., Disp: 1 each, Rfl: 0    Review of Systems     Objective     /82   Pulse 84   Ht 177.8 cm (70\")   Wt (!) 142 kg (314 lb)   SpO2 95%   BMI 45.05 kg/m²     Body mass index is 45.05 kg/m².    Physical Exam  Vitals reviewed.   Constitutional:       Appearance: Normal appearance. She is well-developed.   HENT:      Head: Normocephalic and atraumatic.      Right Ear: External ear normal.      Left Ear: External ear normal.   Eyes:      Conjunctiva/sclera: Conjunctivae normal.      Pupils: Pupils are equal, round, and reactive to light.   Cardiovascular:      Rate and Rhythm: Normal rate and regular rhythm.      Heart sounds: No murmur heard.    No friction rub. No gallop.   Pulmonary:      Effort: Pulmonary effort is normal.      Breath sounds: Normal breath sounds. No wheezing or rhonchi.   Skin:     General: Skin is warm and dry.   Neurological:      Mental Status: She is alert and oriented to person, place, and time.      Cranial Nerves: No cranial nerve deficit.   Psychiatric:         Mood and Affect: Mood and affect normal.         Behavior: Behavior normal.         Thought Content: Thought content normal.         Judgment: Judgment normal.         Result Review :     The following data was reviewed by: ANTELMO Solorio on 06/01/2022:    CMP    CMP 10/12/21   Glucose 90   BUN 17   Creatinine 0.75   eGFR Non African Am 84   Sodium 138   Potassium 4.0   Chloride 102   Calcium 8.9   Albumin 4.30   Total Bilirubin 0.3   Alkaline Phosphatase 77   AST (SGOT) 16   ALT (SGPT) 15           CBC    CBC 10/12/21   WBC 7.56   RBC 4.67   Hemoglobin 13.4 "   Hematocrit 41.8   MCV 89.5   MCH 28.7   MCHC 32.1   RDW 12.5   Platelets 262           TSH    TSH 10/12/21   TSH 2.590                              Assessment:  Diagnoses and all orders for this visit:    1. Moderate persistent asthma with exacerbation (Primary)  Assessment & Plan:  Asthma not well controlled at present, we are going to discontinue Pulmicort and initiate Symbicort, patient actively wheezing today, we will prescribe Medrol Dosepak, patient to follow-up if no improvement.  Referral to allergy placed      Orders:  -     albuterol (PROVENTIL) 2.5 MG/0.5ML nebulizer solution; Take 2.5 mg by nebulization Every 4 (Four) Hours As Needed for Wheezing or Shortness of Air for up to 15 days.  Dispense: 45 mL; Refill: 0  -     Discontinue: budesonide (PULMICORT) 180 MCG/ACT inhaler; Inhale 2 puffs 2 (Two) Times a Day for 30 days.  Dispense: 1 each; Refill: 0  -     budesonide-formoterol (Symbicort) 80-4.5 MCG/ACT inhaler; Inhale 2 puffs 2 (Two) Times a Day.  Dispense: 3 each; Refill: 1  -     methylPREDNISolone (MEDROL) 4 MG dose pack; Take as directed on package instructions.  Dispense: 1 each; Refill: 0    2. Allergic rhinitis, unspecified seasonality, unspecified trigger  -     cetirizine (zyrTEC) 10 MG tablet; Take 1 tablet by mouth Daily.  Dispense: 90 tablet; Refill: 1  -     montelukast (SINGULAIR) 10 MG tablet; Take 1 tablet by mouth Every Night.  Dispense: 90 tablet; Refill: 1  -     fluticasone (FLONASE) 50 MCG/ACT nasal spray; 2 sprays into the nostril(s) as directed by provider Daily for 30 days.  Dispense: 11.1 mL; Refill: 0    3. Chronic left shoulder pain  -     methocarbamol (ROBAXIN) 500 MG tablet; Take 1 tablet by mouth 2 (Two) Times a Day As Needed for Muscle Spasms.  Dispense: 60 tablet; Refill: 2  -     Ambulatory Referral to Pain Management  -     Ambulatory Referral to Physical Therapy Evaluate and treat    4. Nausea  -     ondansetron (ZOFRAN) 4 MG tablet; Take 1 tablet by mouth Every 8  (Eight) Hours As Needed for Nausea or Vomiting.  Dispense: 30 tablet; Refill: 1    5. Gastroesophageal reflux disease, unspecified whether esophagitis present  -     pantoprazole (PROTONIX) 40 MG EC tablet; Take 1 tablet by mouth Daily.  Dispense: 90 tablet; Refill: 1    6. Medication refill  -     pregabalin (Lyrica) 150 MG capsule; Take 1 capsule by mouth 2 (Two) Times a Day.  Dispense: 60 capsule; Refill: 2    7. Encounter for pain management  -     pregabalin (Lyrica) 150 MG capsule; Take 1 capsule by mouth 2 (Two) Times a Day.  Dispense: 60 capsule; Refill: 2    8. Migraine with status migrainosus, not intractable, unspecified migraine type  -     SUMAtriptan (IMITREX) 100 MG tablet; Take 1 tablet by mouth 1 (One) Time As Needed (migraine) for up to 1 dose. Take one tab at onset HA. May repeat dose once in 2 hours if HA not relieved.  Dispense: 13 tablet; Refill: 0    9. Routine lab draw  -     Comprehensive metabolic panel; Future  -     Hepatitis C antibody; Future    10. Screening for lipid disorders    11. Screening for thyroid disorder    12. Need for hepatitis C screening test  -     Hepatitis C antibody; Future    13. Medication management  -     POC Urine Drug Screen [facility]    14. Allergic conjunctivitis of both eyes  -     olopatadine (Patanol) 0.1 % ophthalmic solution; Administer 1 drop to both eyes 2 (Two) Times a Day for 30 days.  Dispense: 5 mL; Refill: 0    15. Irregular menses  -     US Pelvis Complete; Future    16. History of uterine fibroid  -     US Pelvis Complete; Future    17. Pelvic pain  -     US Pelvis Complete; Future    18. Chronic neck pain  -     Ambulatory Referral to Pain Management  -     Ambulatory Referral to Physical Therapy Evaluate and treat    19. Chronic hip pain, unspecified laterality  -     Ambulatory Referral to Pain Management  -     Ambulatory Referral to Physical Therapy Evaluate and treat    Other orders  -     busPIRone (BUSPAR) 30 MG tablet; Take 1  tablet by mouth See Admin Instructions. TAKE ONE TABLET BY MOUTH TWICE DAILY THEN TAKE 1 AND 1/2 TABLETS BY MOUTH AT NIGHT  Dispense: 315 tablet; Refill: 1  -     prazosin (MINIPRESS) 2 MG capsule; Take 1 capsule by mouth Every Night.  Dispense: 90 capsule; Refill: 1  -     QUEtiapine (SEROquel) 300 MG tablet; Take 1 tablet by mouth every night at bedtime.  Dispense: 90 tablet; Refill: 1              Follow Up     Return in about 6 months (around 12/1/2022).    Patient was given instructions and counseling regarding her condition or for health maintenance advice. Please see specific information pulled into the AVS if appropriate.     Winifred Serrano, ANTELMO  06/01/2022

## 2022-06-01 NOTE — ASSESSMENT & PLAN NOTE
Asthma not well controlled at present, we are going to discontinue Pulmicort and initiate Symbicort, patient actively wheezing today, we will prescribe Medrol Dosepak patient to follow-up if no improvement.  Referral to allergy never placed.

## 2022-06-15 PROBLEM — J45.41 MODERATE PERSISTENT ASTHMA WITH EXACERBATION: Status: ACTIVE | Noted: 2022-06-15

## 2022-06-15 NOTE — ASSESSMENT & PLAN NOTE
Asthma not well controlled at present, we are going to discontinue Pulmicort and initiate Symbicort, patient actively wheezing today, we will prescribe Medrol Dosepak, patient to follow-up if no improvement.  Referral to allergy placed

## 2022-06-20 ENCOUNTER — TELEPHONE (OUTPATIENT)
Dept: FAMILY MEDICINE CLINIC | Facility: CLINIC | Age: 47
End: 2022-06-20

## 2022-06-20 DIAGNOSIS — N92.6 IRREGULAR MENSES: ICD-10-CM

## 2022-06-20 DIAGNOSIS — Z86.018 HISTORY OF UTERINE FIBROID: Primary | ICD-10-CM

## 2022-06-20 DIAGNOSIS — R10.2 PELVIC PAIN: ICD-10-CM

## 2022-06-20 NOTE — TELEPHONE ENCOUNTER
I spoke with radiology and she said to put an order in for both Non OB transvaginal ultrasound and the pelvic ultrasound complete with the diagnosis listed. She said the order is different for every facility. The other order is pending

## 2022-06-20 NOTE — TELEPHONE ENCOUNTER
Patient says her ultrasound is suppose to be transvaginal, not pelvis. Did you want to change to transvaginal and cancel the pelvis?

## 2022-06-20 NOTE — TELEPHONE ENCOUNTER
Phoned patient. States she will be going to get her labs done this week. Understands she will need to be fasting for these.

## 2022-06-23 ENCOUNTER — TELEPHONE (OUTPATIENT)
Dept: FAMILY MEDICINE CLINIC | Facility: CLINIC | Age: 47
End: 2022-06-23

## 2022-07-11 ENCOUNTER — TREATMENT (OUTPATIENT)
Dept: PHYSICAL THERAPY | Facility: CLINIC | Age: 47
End: 2022-07-11

## 2022-07-11 DIAGNOSIS — M54.2 CHRONIC NECK PAIN: ICD-10-CM

## 2022-07-11 DIAGNOSIS — G89.29 CHRONIC HIP PAIN, UNSPECIFIED LATERALITY: ICD-10-CM

## 2022-07-11 DIAGNOSIS — G89.29 CHRONIC NECK PAIN: ICD-10-CM

## 2022-07-11 DIAGNOSIS — M25.512 CHRONIC LEFT SHOULDER PAIN: Primary | ICD-10-CM

## 2022-07-11 DIAGNOSIS — M25.559 CHRONIC HIP PAIN, UNSPECIFIED LATERALITY: ICD-10-CM

## 2022-07-11 DIAGNOSIS — G89.29 CHRONIC LEFT SHOULDER PAIN: Primary | ICD-10-CM

## 2022-07-11 PROCEDURE — 97110 THERAPEUTIC EXERCISES: CPT | Performed by: PHYSICAL THERAPIST

## 2022-07-11 PROCEDURE — 97162 PT EVAL MOD COMPLEX 30 MIN: CPT | Performed by: PHYSICAL THERAPIST
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
## 2022-07-19 ENCOUNTER — TELEPHONE (OUTPATIENT)
Dept: FAMILY MEDICINE CLINIC | Facility: CLINIC | Age: 47
End: 2022-07-19

## 2022-07-19 NOTE — TELEPHONE ENCOUNTER
We cannot just order these tests, there has to be a specific evaluation for each 1, x-rays must be obtained first, and a lot of times insurance will require physical therapy before they will approve an MRI.  The provider at pain management should have the capability to order these test him/her self.?  If patient is not able to have ordered through pain management for what ever reason, she can schedule an office visit for evaluation here and we can start the process.  If she opts to schedule here this needs to be a 20-minute slot as this is an extensive evaluation.

## 2022-07-19 NOTE — TELEPHONE ENCOUNTER
Caller: Gabby Aleman    Relationship: Self    Best call back number: [phone]     What orders are you requesting (i.e. lab or imaging): MRI OF LEFT SHOULDER AND L SPINE, ALSO CERVICAL    In what timeframe would the patient need to come in: ASAP    Where will you receive your lab/imaging services: [facility], [place] -  [phone]    Additional notes: PATIENT STATES PAIN MANAGEMENT NEEDS A NEW MRI BECAUSE SHE IS A NEW PATIENT

## 2022-07-21 ENCOUNTER — TELEPHONE (OUTPATIENT)
Dept: FAMILY MEDICINE CLINIC | Facility: CLINIC | Age: 47
End: 2022-07-21

## 2022-07-21 NOTE — TELEPHONE ENCOUNTER
Phoned patient left message on home phone unable to leave message on cell phone since mailbox has not been set up. Left message for the patient to call the office or to call [phone] option 3 to set up the appointment

## 2022-09-07 ENCOUNTER — TREATMENT (OUTPATIENT)
Dept: PHYSICAL THERAPY | Facility: CLINIC | Age: 47
End: 2022-09-07

## 2022-09-07 DIAGNOSIS — M25.559 CHRONIC HIP PAIN, UNSPECIFIED LATERALITY: ICD-10-CM

## 2022-09-07 DIAGNOSIS — G89.29 CHRONIC HIP PAIN, UNSPECIFIED LATERALITY: ICD-10-CM

## 2022-09-07 DIAGNOSIS — G89.29 CHRONIC NECK PAIN: ICD-10-CM

## 2022-09-07 DIAGNOSIS — M54.2 CHRONIC NECK PAIN: ICD-10-CM

## 2022-09-07 DIAGNOSIS — G89.29 CHRONIC LEFT SHOULDER PAIN: Primary | ICD-10-CM

## 2022-09-07 DIAGNOSIS — M25.512 CHRONIC LEFT SHOULDER PAIN: Primary | ICD-10-CM

## 2022-09-07 PROCEDURE — 97164 PT RE-EVAL EST PLAN CARE: CPT | Performed by: PHYSICAL THERAPIST

## 2022-09-07 NOTE — PROGRESS NOTES
Physical Therapy Progress Note      Patient: Gabby Aleman   : 1975  Referring practitioner: ANTELMO Juarez  Date of Initial Visit: Type: THERAPY  Noted: 2022  Today's Date: 2022  Patient seen for 2 sessions           Subjective   Gabby Aleman reports: has not been able to attend therapy session since 22 and cancelled appts instead of no show appts secondary to did not want to get kicked out of therapy.  Pain 5/10 in neck/shoulders and right hip   Subjective Questionnaire: QuickDASH: 04=147% limitation      Objective          Postural Observations    Additional Postural Observation Details  Forward rolled shoulders/protracted scapula/forward head    Palpation     Additional Palpation Details  Generalized tenderness throughout unable to be specific     Active Range of Motion   Left Shoulder   Flexion: 50 degrees   Abduction: 50 degrees   External rotation 45°: 10 degrees   Internal rotation 45°: 10 degrees     Right Shoulder   Flexion: 150 degrees   Abduction: 145 degrees   Left Hip   Normal active range of motion  Left Knee   Normal active range of motion    Additional Active Range of Motion Details  Patient demonstrates 25% of cervical AROM   Patient very guarded with shoulder AROM    Strength/Myotome Testing     Left Shoulder     Planes of Motion   Flexion: 3   Extension: 3   Abduction: 3-   External rotation at 0°: 3-   Internal rotation at 0°: 3     Right Shoulder     Planes of Motion   Flexion: 4   Extension: 4   Abduction: 4   Adduction: 4     Left Hip   Planes of Motion   Flexion: 4+  Extension: 4  Abduction: 4  Adduction: 4+    Right Hip   Planes of Motion   Flexion: 4+  Extension: 4  Abduction: 4  Adduction: 4+      See Exercise, Manual, and Modality Logs for complete treatment.       Assessment & Plan     Assessment  Impairments: abnormal gait, abnormal or restricted ROM, activity intolerance, impaired physical strength, lacks appropriate home exercise program and pain  with function  Functional Limitations: carrying objects, lifting, walking, pulling, pushing, uncomfortable because of pain, standing, stooping, reaching behind back, reaching overhead and unable to perform repetitive tasks  Assessment details: Pt presents with limitations, noted by evaluation that impede patient's ability to tolerate functional mobility/activity.  The skills of a therapist will be required to safely and effectively implement the following treatment plan to restore maximal level of function.    Prognosis: good  Prognosis details: Patient has had multiple different issues (depression/unable to get out of home)and unable to attend therapy since initial evaluation on 7/11/22.  Patient states has been performing home exercise program and getting better.  Patient reports would like to try aquatic therapy.     Goals  Plan Goals: 1. The patient has limited ROM of bilateral shoulder.    LTG 1: 8 weeks:  The patient will demonstrate 150 degrees of bilateral shoulder flexion, 140 degrees of shoulder abduction, 60 degrees of shoulder external rotation, and shoulder internal rotation to 60 to allow the patient to reach into upper kitchen cabinets and manipulate clothing behind the back with greater ease.   STATUS:  Met for right, but no change in left shoulder  STG 1a: 4 weeks:  The patient will demonstrate 120 degrees of bilateral shoulder flexion, 90 degrees of shoulder abduction, 40 degrees of shoulder external rotation, and shoulder internal rotation to 40 to allow the patient to reach into upper kitchen cabinets and manipulate clothing behind the back with greater ease.   STATUS:  Met for right, but no change in left shoulder     2. The patient has limited strength of the bilateral shoulder/left hip.   LTG 2: 8 weeks:  The patient will demonstrate 4/5 strength for bilateral shoulder flexion, abduction, external rotation, and internal rotation in order to demonstrate improved shoulder stability.   STATUS:   Met for right; not met for left shoulder   STG 2a: 4 weeks:  The patient will demonstrate 4/5 strength for left hip flexion, abduction, and extension   STATUS:  Met  STG2b:  2 weeks:  The patient will be independent with home exercises.    STATUS:  Met     3. The patient complains of pain to the bilateral shoulder/neck/left hip.   LTG 3: 8 weeks:  The patient will report a pain rating of 2/10 or better in order to improve sleep quality and tolerance to performance of activities of daily living.   STATUS: ongoing  STG 3a: 4 weeks:  The patient will report a pain rating of 4/10 or better.    STATUS: ongoing      4. Carrying, Moving, and Handling Objects Functional Limitation     LTG 4: 8 weeks:  The patient will demonstrate 40-59% limitation by achieving a score of 35 on the Quick DASH.   STATUS:  ongoing  STG 4a: 4 weeks:  The patient will demonstrate 60-79% limitation by achieving a score of 45 on the Quick DASH.     STATUS:  ongoing    Plan  Therapy options: will be seen for skilled therapy services  Planned modality interventions: hydrotherapy  Planned therapy interventions: abdominal trunk stabilization, postural training, strengthening, stretching, therapeutic activities, transfer training, home exercise program, functional ROM exercises and flexibility  Frequency: 1x week  Duration in weeks: 8  Treatment plan discussed with: patient        Visit Diagnoses:    ICD-10-CM ICD-9-CM   1. Chronic left shoulder pain  M25.512 719.41    G89.29 338.29   2. Chronic neck pain  M54.2 723.1    G89.29 338.29   3. Chronic hip pain, unspecified laterality  M25.559 719.45    G89.29 338.29       Progress per Plan of Care           Timed:  Manual Therapy:         mins  78279;  Therapeutic Exercise:         mins  09628;     Neuromuscular Jennie:        mins  36614;    Therapeutic Activity:          mins  27737;     Gait Training:           mins  94082;     Ultrasound:          mins  40100;    Electrical Stimulation:         mins   43698 ( );    Untimed:  Electrical Stimulation:         mins  03562 ( );  Mechanical Traction:         mins  76834;   PT re-eval 25 mins [id number]    Timed Treatment:      mins   Total Treatment:     25   mins  Nadya Melchor PT    Electronically singed 9/7/2022      [place] PT license: [id number]  Physical Therapist

## 2022-09-19 DIAGNOSIS — R11.0 NAUSEA: ICD-10-CM

## 2022-09-19 RX ORDER — ONDANSETRON 4 MG/1
TABLET, FILM COATED ORAL
Qty: 30 TABLET | Refills: 1 | Status: SHIPPED | OUTPATIENT
Start: 2022-09-19 | End: 2023-03-29

## 2022-11-02 ENCOUNTER — DOCUMENTATION (OUTPATIENT)
Dept: PHYSICAL THERAPY | Facility: CLINIC | Age: 47
End: 2022-11-02

## 2022-11-03 ENCOUNTER — TELEPHONE (OUTPATIENT)
Dept: FAMILY MEDICINE CLINIC | Facility: CLINIC | Age: 47
End: 2022-11-03

## 2022-11-03 DIAGNOSIS — J45.41 MODERATE PERSISTENT ASTHMA WITH EXACERBATION: ICD-10-CM

## 2022-11-03 RX ORDER — PREDNISONE 20 MG/1
TABLET ORAL
Qty: 10 TABLET | Refills: 0 | OUTPATIENT
Start: 2022-11-03

## 2022-11-03 RX ORDER — BUDESONIDE AND FORMOTEROL FUMARATE DIHYDRATE 80; 4.5 UG/1; UG/1
2 AEROSOL RESPIRATORY (INHALATION)
Qty: 3 EACH | Refills: 0 | Status: SHIPPED | OUTPATIENT
Start: 2022-11-03 | End: 2023-03-28

## 2022-11-03 NOTE — TELEPHONE ENCOUNTER
Spoke to brice at [facility] to confirm any refills on Symbicort. Pt is due for refill advised per Sri consent to approve refill for Symbicort. Pt was advised to go to  to be evaluated.

## 2022-11-03 NOTE — TELEPHONE ENCOUNTER
Patient called earlier wanting a prescription refill for Symbicort and Prednisone. Patient stated she was having trouble breathing. The front office had offered her four different appointment times today and tomorrow and the patient stated that she could not attend any of them, but needed the medication before she left for [place]. I attempted to help her get my chart set up to maybe do a telehealth, but she stated she was locked out of her [employer] account.  Informed patient that we could not prescribe prednisone without being seen. She said to just refill the Symbicort and she will get the Prednisone in [place].

## 2022-11-08 RX ORDER — QUETIAPINE FUMARATE 300 MG/1
300 TABLET, FILM COATED ORAL
Qty: 90 TABLET | Refills: 1 | Status: SHIPPED | OUTPATIENT
Start: 2022-11-08

## 2022-12-05 DIAGNOSIS — M25.512 CHRONIC LEFT SHOULDER PAIN: ICD-10-CM

## 2022-12-05 DIAGNOSIS — G89.29 CHRONIC LEFT SHOULDER PAIN: ICD-10-CM

## 2022-12-06 RX ORDER — METHOCARBAMOL 500 MG/1
TABLET, FILM COATED ORAL
Qty: 60 TABLET | Refills: 2 | Status: SHIPPED | OUTPATIENT
Start: 2022-12-06